# Patient Record
Sex: MALE | Race: BLACK OR AFRICAN AMERICAN | URBAN - METROPOLITAN AREA
[De-identification: names, ages, dates, MRNs, and addresses within clinical notes are randomized per-mention and may not be internally consistent; named-entity substitution may affect disease eponyms.]

---

## 2022-03-17 ENCOUNTER — WEB ENCOUNTER (OUTPATIENT)
Dept: URBAN - METROPOLITAN AREA CLINIC 105 | Facility: CLINIC | Age: 37
End: 2022-03-17

## 2022-03-18 ENCOUNTER — OFFICE VISIT (OUTPATIENT)
Dept: URBAN - METROPOLITAN AREA CLINIC 105 | Facility: CLINIC | Age: 37
End: 2022-03-18
Payer: OTHER GOVERNMENT

## 2022-03-18 ENCOUNTER — WEB ENCOUNTER (OUTPATIENT)
Dept: URBAN - METROPOLITAN AREA CLINIC 105 | Facility: CLINIC | Age: 37
End: 2022-03-18

## 2022-03-18 ENCOUNTER — LAB OUTSIDE AN ENCOUNTER (OUTPATIENT)
Dept: URBAN - METROPOLITAN AREA CLINIC 105 | Facility: CLINIC | Age: 37
End: 2022-03-18

## 2022-03-18 VITALS
DIASTOLIC BLOOD PRESSURE: 84 MMHG | SYSTOLIC BLOOD PRESSURE: 133 MMHG | BODY MASS INDEX: 39.17 KG/M2 | HEIGHT: 75 IN | TEMPERATURE: 96.8 F | HEART RATE: 99 BPM | WEIGHT: 315 LBS

## 2022-03-18 DIAGNOSIS — R10.13 ABDOMINAL PAIN, ACUTE, EPIGASTRIC: ICD-10-CM

## 2022-03-18 DIAGNOSIS — K85.00 IDIOPATHIC ACUTE PANCREATITIS, UNSPECIFIED COMPLICATION STATUS: ICD-10-CM

## 2022-03-18 DIAGNOSIS — E66.9 CLASS 3 OBESITY: ICD-10-CM

## 2022-03-18 DIAGNOSIS — E11.9 TYPE 2 DIABETES MELLITUS WITHOUT COMPLICATION, UNSPECIFIED WHETHER LONG TERM INSULIN USE: ICD-10-CM

## 2022-03-18 PROCEDURE — 99204 OFFICE O/P NEW MOD 45 MIN: CPT | Performed by: INTERNAL MEDICINE

## 2022-03-18 RX ORDER — ONDANSETRON 4 MG/1
PLACE ONE TABLET ON THE TONGUE EVERY 8 HOURS AS NEEDED FOR NAUSEA TABLET, ORALLY DISINTEGRATING ORAL
Qty: 20 | Refills: 0 | Status: ACTIVE | COMMUNITY

## 2022-03-18 RX ORDER — FLASH GLUCOSE SCANNING READER
USE AS DIRECTED EACH MISCELLANEOUS
Qty: 1 | Refills: 0 | Status: ACTIVE | COMMUNITY

## 2022-03-18 RX ORDER — LISINOPRIL 2.5 MG/1
TAKE ONE TABLET BY MOUTH ONE TIME DAILY TABLET ORAL
Qty: 90 | Refills: 0 | Status: ACTIVE | COMMUNITY

## 2022-03-18 RX ORDER — FLASH GLUCOSE SENSOR
APPLY ONE SENSOR TO THE BACK OF YOUR UPPER ARM. REPLACE EVERY 14 DAYS KIT MISCELLANEOUS
Qty: 2 | Refills: 1 | Status: ACTIVE | COMMUNITY

## 2022-03-18 RX ORDER — TADALAFIL 20 MG/1
TAKE 1-3 TABLETS BY MOUTH ONE HOUR PRIOR TO SEXUAL ACTIVITY TABLET, FILM COATED ORAL
Qty: 45 | Refills: 0 | Status: ACTIVE | COMMUNITY

## 2022-03-18 RX ORDER — IBUPROFEN 800 MG/1
TAKE ONE TABLET BY MOUTH EVERY 8 HOURS AS NEEDED FOR PAIN TABLET, FILM COATED ORAL
Qty: 21 | Refills: 0 | Status: ACTIVE | COMMUNITY

## 2022-03-18 RX ORDER — METFORMIN HYDROCHLORIDE 1000 MG/1
TAKE ONE TABLET BY MOUTH ONE TIME DAILY TABLET ORAL
Qty: 90 | Refills: 0 | Status: ACTIVE | COMMUNITY

## 2022-03-18 RX ORDER — EMPAGLIFLOZIN AND METFORMIN HYDROCHLORIDE 5; 1000 MG/1; MG/1
TAKE ONE TABLET BY MOUTH ONE TIME DAILY TABLET ORAL
Qty: 90 | Refills: 0 | Status: ACTIVE | COMMUNITY

## 2022-03-18 RX ORDER — BLOOD SUGAR DIAGNOSTIC
USE TO CHECK BLOOD SUGAR THREE TIMES A DAY STRIP MISCELLANEOUS
Qty: 250 | Refills: 0 | Status: ACTIVE | COMMUNITY

## 2022-03-18 NOTE — HPI-TODAY'S VISIT:
- [facility] - pancreatitis - no abdominal surgeries - feels ok - no pain; initially had acute epigastric abdominal pain - not smoking; 3 years ago was smoking - drinks etoh - once or twice a month - no fam hx of pancreatitis  - on synjardy for 3 years; all his emeds been taking for years  - at this point etiology not clear The patient was referred by Dr. Witt for acute pancreatitis .   A copy of this document is being forwarded to the referring provider.

## 2022-03-21 PROBLEM — 313436004: Status: ACTIVE | Noted: 2022-03-21

## 2022-03-21 PROBLEM — 819948005: Status: ACTIVE | Noted: 2022-03-21

## 2022-03-21 PROBLEM — 79922009: Status: ACTIVE | Noted: 2022-03-21

## 2022-03-21 PROBLEM — 235943006: Status: ACTIVE | Noted: 2022-03-21

## 2022-03-30 ENCOUNTER — TELEPHONE ENCOUNTER (OUTPATIENT)
Dept: URBAN - METROPOLITAN AREA CLINIC 105 | Facility: CLINIC | Age: 37
End: 2022-03-30

## 2022-04-01 ENCOUNTER — OFFICE VISIT (OUTPATIENT)
Dept: URBAN - METROPOLITAN AREA CLINIC 105 | Facility: CLINIC | Age: 37
End: 2022-04-01
Payer: OTHER GOVERNMENT

## 2022-04-01 ENCOUNTER — LAB OUTSIDE AN ENCOUNTER (OUTPATIENT)
Dept: URBAN - METROPOLITAN AREA CLINIC 105 | Facility: CLINIC | Age: 37
End: 2022-04-01

## 2022-04-01 VITALS
WEIGHT: 315 LBS | SYSTOLIC BLOOD PRESSURE: 118 MMHG | DIASTOLIC BLOOD PRESSURE: 74 MMHG | BODY MASS INDEX: 39.17 KG/M2 | HEART RATE: 78 BPM | TEMPERATURE: 97.8 F | HEIGHT: 75 IN

## 2022-04-01 DIAGNOSIS — K80.20 CALCULUS OF GALLBLADDER WITHOUT CHOLECYSTITIS WITHOUT OBSTRUCTION: ICD-10-CM

## 2022-04-01 DIAGNOSIS — R10.13 EPIGASTRIC ABDOMINAL PAIN: ICD-10-CM

## 2022-04-01 DIAGNOSIS — K85.90 ACUTE PANCREATITIS, UNSPECIFIED COMPLICATION STATUS, UNSPECIFIED PANCREATITIS TYPE: ICD-10-CM

## 2022-04-01 PROBLEM — 70342003: Status: ACTIVE | Noted: 2022-04-01

## 2022-04-01 PROCEDURE — 99214 OFFICE O/P EST MOD 30 MIN: CPT | Performed by: INTERNAL MEDICINE

## 2022-04-01 RX ORDER — FLASH GLUCOSE SENSOR
APPLY ONE SENSOR TO THE BACK OF YOUR UPPER ARM. REPLACE EVERY 14 DAYS KIT MISCELLANEOUS
Qty: 2 | Refills: 1 | Status: ACTIVE | COMMUNITY

## 2022-04-01 RX ORDER — METFORMIN HYDROCHLORIDE 1000 MG/1
TAKE ONE TABLET BY MOUTH ONE TIME DAILY TABLET ORAL
Qty: 90 | Refills: 0 | Status: ACTIVE | COMMUNITY

## 2022-04-01 RX ORDER — EMPAGLIFLOZIN AND METFORMIN HYDROCHLORIDE 5; 1000 MG/1; MG/1
TAKE ONE TABLET BY MOUTH ONE TIME DAILY TABLET ORAL
Qty: 90 | Refills: 0 | Status: ACTIVE | COMMUNITY

## 2022-04-01 RX ORDER — IBUPROFEN 800 MG/1
TAKE ONE TABLET BY MOUTH EVERY 8 HOURS AS NEEDED FOR PAIN TABLET, FILM COATED ORAL
Qty: 21 | Refills: 0 | Status: ACTIVE | COMMUNITY

## 2022-04-01 RX ORDER — ONDANSETRON 4 MG/1
PLACE ONE TABLET ON THE TONGUE EVERY 8 HOURS AS NEEDED FOR NAUSEA TABLET, ORALLY DISINTEGRATING ORAL
Qty: 20 | Refills: 0 | Status: ACTIVE | COMMUNITY

## 2022-04-01 RX ORDER — OMEPRAZOLE 20 MG/1
1 CAPSULE 30 MINUTES BEFORE MORNING MEAL CAPSULE, DELAYED RELEASE ORAL ONCE A DAY
Qty: 30 | OUTPATIENT
Start: 2022-04-01

## 2022-04-01 RX ORDER — LISINOPRIL 2.5 MG/1
TAKE ONE TABLET BY MOUTH ONE TIME DAILY TABLET ORAL
Qty: 90 | Refills: 0 | Status: ACTIVE | COMMUNITY

## 2022-04-01 RX ORDER — TADALAFIL 20 MG/1
TAKE 1-3 TABLETS BY MOUTH ONE HOUR PRIOR TO SEXUAL ACTIVITY TABLET, FILM COATED ORAL
Qty: 45 | Refills: 0 | Status: ACTIVE | COMMUNITY

## 2022-04-01 RX ORDER — FLASH GLUCOSE SCANNING READER
USE AS DIRECTED EACH MISCELLANEOUS
Qty: 1 | Refills: 0 | Status: ACTIVE | COMMUNITY

## 2022-04-01 RX ORDER — BLOOD SUGAR DIAGNOSTIC
USE TO CHECK BLOOD SUGAR THREE TIMES A DAY STRIP MISCELLANEOUS
Qty: 250 | Refills: 0 | Status: ACTIVE | COMMUNITY

## 2022-04-01 NOTE — HPI-TODAY'S VISIT:
- epigastric abd pain 3/8; radiating to the back;  -  been on motrin 800 for his pancreatitis - otherwise stable - no nausea or vomiting; not drinking; wt stable

## 2022-04-06 ENCOUNTER — DASHBOARD ENCOUNTERS (OUTPATIENT)
Age: 37
End: 2022-04-06

## 2022-04-19 ENCOUNTER — OFFICE VISIT (OUTPATIENT)
Dept: URBAN - METROPOLITAN AREA MEDICAL CENTER 33 | Facility: MEDICAL CENTER | Age: 37
End: 2022-04-19

## 2022-04-20 ENCOUNTER — OFFICE VISIT (OUTPATIENT)
Dept: URBAN - METROPOLITAN AREA CLINIC 105 | Facility: CLINIC | Age: 37
End: 2022-04-20

## 2022-04-20 RX ORDER — IBUPROFEN 800 MG/1
TAKE ONE TABLET BY MOUTH EVERY 8 HOURS AS NEEDED FOR PAIN TABLET, FILM COATED ORAL
Qty: 21 | Refills: 0 | Status: ACTIVE | COMMUNITY

## 2022-04-20 RX ORDER — OMEPRAZOLE 20 MG/1
1 CAPSULE 30 MINUTES BEFORE MORNING MEAL CAPSULE, DELAYED RELEASE ORAL ONCE A DAY
Qty: 30 | Status: ACTIVE | COMMUNITY
Start: 2022-04-01

## 2022-04-20 RX ORDER — EMPAGLIFLOZIN AND METFORMIN HYDROCHLORIDE 5; 1000 MG/1; MG/1
TAKE ONE TABLET BY MOUTH ONE TIME DAILY TABLET ORAL
Qty: 90 | Refills: 0 | Status: ACTIVE | COMMUNITY

## 2022-04-20 RX ORDER — FLASH GLUCOSE SENSOR
APPLY ONE SENSOR TO THE BACK OF YOUR UPPER ARM. REPLACE EVERY 14 DAYS KIT MISCELLANEOUS
Qty: 2 | Refills: 1 | Status: ACTIVE | COMMUNITY

## 2022-04-20 RX ORDER — TADALAFIL 20 MG/1
TAKE 1-3 TABLETS BY MOUTH ONE HOUR PRIOR TO SEXUAL ACTIVITY TABLET, FILM COATED ORAL
Qty: 45 | Refills: 0 | Status: ACTIVE | COMMUNITY

## 2022-04-20 RX ORDER — LISINOPRIL 2.5 MG/1
TAKE ONE TABLET BY MOUTH ONE TIME DAILY TABLET ORAL
Qty: 90 | Refills: 0 | Status: ACTIVE | COMMUNITY

## 2022-04-20 RX ORDER — ONDANSETRON 4 MG/1
PLACE ONE TABLET ON THE TONGUE EVERY 8 HOURS AS NEEDED FOR NAUSEA TABLET, ORALLY DISINTEGRATING ORAL
Qty: 20 | Refills: 0 | Status: ACTIVE | COMMUNITY

## 2022-04-20 RX ORDER — BLOOD SUGAR DIAGNOSTIC
USE TO CHECK BLOOD SUGAR THREE TIMES A DAY STRIP MISCELLANEOUS
Qty: 250 | Refills: 0 | Status: ACTIVE | COMMUNITY

## 2022-04-20 RX ORDER — METFORMIN HYDROCHLORIDE 1000 MG/1
TAKE ONE TABLET BY MOUTH ONE TIME DAILY TABLET ORAL
Qty: 90 | Refills: 0 | Status: ACTIVE | COMMUNITY

## 2022-04-20 RX ORDER — FLASH GLUCOSE SCANNING READER
USE AS DIRECTED EACH MISCELLANEOUS
Qty: 1 | Refills: 0 | Status: ACTIVE | COMMUNITY